# Patient Record
Sex: MALE | Race: OTHER | URBAN - METROPOLITAN AREA
[De-identification: names, ages, dates, MRNs, and addresses within clinical notes are randomized per-mention and may not be internally consistent; named-entity substitution may affect disease eponyms.]

---
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
## 2024-04-14 ENCOUNTER — OFFICE VISIT (OUTPATIENT)
Dept: FAMILY MEDICINE CLINIC | Facility: CLINIC | Age: 19
End: 2024-04-14
Payer: COMMERCIAL

## 2024-04-14 VITALS
BODY MASS INDEX: 21.98 KG/M2 | OXYGEN SATURATION: 97 % | TEMPERATURE: 98 F | DIASTOLIC BLOOD PRESSURE: 70 MMHG | HEIGHT: 68 IN | HEART RATE: 65 BPM | SYSTOLIC BLOOD PRESSURE: 102 MMHG | WEIGHT: 145 LBS | RESPIRATION RATE: 18 BRPM

## 2024-04-14 DIAGNOSIS — H01.004 BLEPHARITIS OF LEFT UPPER EYELID, UNSPECIFIED TYPE: Primary | ICD-10-CM

## 2024-04-14 RX ORDER — ERYTHROMYCIN 5 MG/G
1 OINTMENT OPHTHALMIC EVERY 6 HOURS
Qty: 1 EACH | Refills: 0 | Status: SHIPPED | OUTPATIENT
Start: 2024-04-14 | End: 2024-04-21

## 2024-04-14 NOTE — PROGRESS NOTES
CHIEF COMPLAINT:     Chief Complaint   Patient presents with    Eye Problem     Swollen left eyelid ( painful) since Friday  night        HPI:   Dustin Muñoz is a 18 year old male who presents with chief complaint of possible \"pink eye\". Symptoms began  2  days ago. Symptoms have been worsening since onset.   Patient reports Left upper eyelid swelling and redness.  Mild crusting to inner eye in the morning, but no current drainage.  Denies fever, cold symptoms, or contact with irritant. No eye pain, or difficulty moving eye. No vision changes.   Treatments tried: red-eye drops.      Current Outpatient Medications   Medication Sig Dispense Refill    erythromycin 5 MG/GM Ophthalmic Ointment Place 1 Application into the left eye every 6 (six) hours for 7 days. 1 each 0      No past medical history on file.   No past surgical history on file.   No family history on file.   Social History     Socioeconomic History    Marital status: Single   Tobacco Use    Smoking status: Never         REVIEW OF SYSTEMS:   GENERAL: feels well otherwise, no fevers/body aches/chills  SKIN: no rashes  EYES:denies blurred vision or double vision. See HPI  HENT: denies ear pain, congestion, sore throat  LUNGS: denies shortness of breath or cough  CARDIOVASCULAR: denies chest pain or palpitations   GI: denies N/V/C or abdominal pain  NEURO: denies headaches     EXAM:   /70   Pulse 65   Temp 97.7 °F (36.5 °C)   Resp 18   Ht 5' 8\" (1.727 m)   Wt 145 lb (65.8 kg)   SpO2 97%   BMI 22.05 kg/m²   GENERAL: well developed, well nourished,in no apparent distress  SKIN: no rashes,no suspicious lesions  EYES: PERRLA, EOMI, normal optic disk,  conjunctiva  non-erythematous, injected, No discharge. Left upper lid with mild erythema/edema. NO papule/stye visualized  HENT: atraumatic, normocephalic,TM's pearly gray, with no bulging or erythema. Nose with  discharge, mucosa pink.NECK: supple, non tender  LUNGS: clear to auscultation bilaterally.    CARDIO: RRR without murmur      ASSESSMENT AND PLAN:   Dustin Muñoz is a 18 year old male who presents with:    ASSESSMENT:   Encounter Diagnosis   Name Primary?    Blepharitis of left upper eyelid, unspecified type Yes       PLAN: Hygeine and comfort care as listen in patient instructions.  Warm compresses. Medication as listed below.  If any vision changes or eye pain seek emergent care. Follow up with PCP in 2-3 days if no improvement. Advised patient to avoid touching eyes.     Requested Prescriptions     Signed Prescriptions Disp Refills    erythromycin 5 MG/GM Ophthalmic Ointment 1 each 0     Sig: Place 1 Application into the left eye every 6 (six) hours for 7 days.         Risks, benefits, complications and side effects of meds discussed.        There are no Patient Instructions on file for this visit.      Call or return if not improved in 2-3 days.  The patient is asked to follow up with their PCP prn.

## 2024-04-21 ENCOUNTER — OFFICE VISIT (OUTPATIENT)
Dept: FAMILY MEDICINE CLINIC | Facility: CLINIC | Age: 19
End: 2024-04-21
Payer: COMMERCIAL

## 2024-04-21 VITALS
HEIGHT: 68 IN | TEMPERATURE: 99 F | HEART RATE: 89 BPM | SYSTOLIC BLOOD PRESSURE: 113 MMHG | WEIGHT: 144 LBS | DIASTOLIC BLOOD PRESSURE: 68 MMHG | RESPIRATION RATE: 18 BRPM | BODY MASS INDEX: 21.82 KG/M2 | OXYGEN SATURATION: 98 %

## 2024-04-21 DIAGNOSIS — J32.9 RHINOSINUSITIS: Primary | ICD-10-CM

## 2024-04-21 PROCEDURE — 3078F DIAST BP <80 MM HG: CPT

## 2024-04-21 PROCEDURE — 3074F SYST BP LT 130 MM HG: CPT

## 2024-04-21 PROCEDURE — 3008F BODY MASS INDEX DOCD: CPT

## 2024-04-21 PROCEDURE — 99213 OFFICE O/P EST LOW 20 MIN: CPT

## 2024-04-21 RX ORDER — AMOXICILLIN AND CLAVULANATE POTASSIUM 875; 125 MG/1; MG/1
1 TABLET, FILM COATED ORAL 2 TIMES DAILY
Qty: 20 TABLET | Refills: 0 | Status: SHIPPED | OUTPATIENT
Start: 2024-04-21 | End: 2024-05-01

## 2024-04-21 NOTE — PROGRESS NOTES
CHIEF COMPLAINT:     Chief Complaint   Patient presents with    Sinus Problem     Runny nose x 1 week, mild cough x 2 days, L ear clogged x 1 day, OTC advil cold and sinus        HPI:   Dustin Muñoz is a 18 year old male who presents for upper respiratory symptoms for  1 weeks. Patient reports congestion, dry cough, ear pain, denies fever. Symptoms have been worsening since onset.  Treating symptoms with advil cold and sinus. Denies any known exposures or other members in house sick with similar symptoms.      Current Outpatient Medications   Medication Sig Dispense Refill    erythromycin 5 MG/GM Ophthalmic Ointment Place 1 Application into the left eye every 6 (six) hours for 7 days. 1 each 0      History reviewed. No pertinent past medical history.   History reviewed. No pertinent surgical history.      Social History     Socioeconomic History    Marital status: Single   Tobacco Use    Smoking status: Never   Vaping Use    Vaping status: Some Days   Substance and Sexual Activity    Drug use: Yes     Types: Cannabis         REVIEW OF SYSTEMS:   GENERAL: no change in appetite  SKIN: no rashes or abnormal skin lesions  HEENT: See HPI  LUNGS: See HPI  CARDIOVASCULAR: denies chest pain or palpitations   GI: denies N/V/C or abdominal pain      EXAM:   /68   Pulse 89   Temp 99.2 °F (37.3 °C)   Resp 18   Ht 5' 8\" (1.727 m)   Wt 144 lb (65.3 kg)   SpO2 98%   BMI 21.90 kg/m²   GENERAL: well developed, well nourished,in no apparent distress  SKIN: no rashes,no suspicious lesions  HEAD: atraumatic, normocephalic.  no tenderness on palpation of frontal and maxillary sinuses  EYES: conjunctiva clear, EOM intact  EARS: TM's pearly and intact, no bulging, no retraction,no fluid, bony landmarks visualized  NOSE: Nostrils patent, clear nasal discharge, nasal mucosa edematous and erythematous   THROAT: Oral mucosa pink, moist. Posterior pharynx is non erythematous. no exudates. Tonsils 1/4.    NECK: Supple,  non-tender  LUNGS: clear to auscultation bilaterally, no wheezes or rhonchi. Breathing is non labored.  CARDIO: RRR without murmur  EXTREMITIES: no cyanosis, clubbing or edema  LYMPH:  positive anterior cervical lymphadenopathy.        ASSESSMENT AND PLAN:   Dustin Muñoz is a 18 year old male who presents with upper respiratory symptoms that are consistent with    ASSESSMENT:   Encounter Diagnosis   Name Primary?    Rhinosinusitis Yes       PLAN: Meds as below.  Comfort care as described in Patient Instructions. Discussion about probable viral cause of symptoms and supportive treatment. Recommended that patient start daily antihistamine and sudafed. Printed script given and instructed that if symptoms continue or worsen can start on 04/27.    Meds & Refills for this Visit:  Requested Prescriptions     Signed Prescriptions Disp Refills    amoxicillin clavulanate 875-125 MG Oral Tab 20 tablet 0     Sig: Take 1 tablet by mouth 2 (two) times daily for 10 days.     Risks, benefits, and side effects of medication explained and discussed.    The patient indicates understanding of these issues and agrees to the plan.  The patient is asked to f/u with PCP if sx's persist or worsen.